# Patient Record
Sex: FEMALE | Race: WHITE | Employment: FULL TIME | URBAN - METROPOLITAN AREA
[De-identification: names, ages, dates, MRNs, and addresses within clinical notes are randomized per-mention and may not be internally consistent; named-entity substitution may affect disease eponyms.]

---

## 2023-01-13 ENCOUNTER — HOSPITAL ENCOUNTER (EMERGENCY)
Age: 33
Discharge: HOME | End: 2023-01-13
Attending: STUDENT IN AN ORGANIZED HEALTH CARE EDUCATION/TRAINING PROGRAM
Payer: COMMERCIAL

## 2023-01-13 VITALS
OXYGEN SATURATION: 99 % | WEIGHT: 164.5 LBS | HEART RATE: 93 BPM | TEMPERATURE: 98.8 F | SYSTOLIC BLOOD PRESSURE: 122 MMHG | DIASTOLIC BLOOD PRESSURE: 84 MMHG | RESPIRATION RATE: 16 BRPM

## 2023-01-13 DIAGNOSIS — T65.91XA INGESTION OF SUBSTANCE, ACCIDENTAL OR UNINTENTIONAL, INITIAL ENCOUNTER: Primary | ICD-10-CM

## 2023-01-13 LAB
AMPHETAMINE SCREEN, URINE: POSITIVE
ANION GAP SERPL CALCULATED.3IONS-SCNC: 13 MMOL/L (ref 3–16)
BARBITURATE SCREEN URINE: ABNORMAL
BENZODIAZEPINE SCREEN, URINE: ABNORMAL
BUN BLDV-MCNC: 13 MG/DL (ref 7–20)
CALCIUM SERPL-MCNC: 9.5 MG/DL (ref 8.3–10.6)
CANNABINOID SCREEN URINE: POSITIVE
CHLORIDE BLD-SCNC: 101 MMOL/L (ref 99–110)
CO2: 25 MMOL/L (ref 21–32)
COCAINE METABOLITE SCREEN URINE: POSITIVE
CREAT SERPL-MCNC: 0.6 MG/DL (ref 0.6–1.1)
EKG ATRIAL RATE: 121 BPM
EKG DIAGNOSIS: NORMAL
EKG P AXIS: 75 DEGREES
EKG P-R INTERVAL: 134 MS
EKG Q-T INTERVAL: 348 MS
EKG QRS DURATION: 78 MS
EKG QTC CALCULATION (BAZETT): 494 MS
EKG R AXIS: 88 DEGREES
EKG T AXIS: 39 DEGREES
EKG VENTRICULAR RATE: 121 BPM
FENTANYL SCREEN, URINE: POSITIVE
GFR SERPL CREATININE-BSD FRML MDRD: >60 ML/MIN/{1.73_M2}
GLUCOSE BLD-MCNC: 94 MG/DL (ref 70–99)
Lab: ABNORMAL
METHADONE SCREEN, URINE: ABNORMAL
OPIATE SCREEN URINE: ABNORMAL
OXYCODONE URINE: ABNORMAL
PH UA: 5
PHENCYCLIDINE SCREEN URINE: ABNORMAL
POTASSIUM REFLEX MAGNESIUM: 3.6 MMOL/L (ref 3.5–5.1)
SODIUM BLD-SCNC: 139 MMOL/L (ref 136–145)
TROPONIN: <0.01 NG/ML

## 2023-01-13 PROCEDURE — 99284 EMERGENCY DEPT VISIT MOD MDM: CPT

## 2023-01-13 PROCEDURE — 36415 COLL VENOUS BLD VENIPUNCTURE: CPT

## 2023-01-13 PROCEDURE — 80307 DRUG TEST PRSMV CHEM ANLYZR: CPT

## 2023-01-13 PROCEDURE — 84484 ASSAY OF TROPONIN QUANT: CPT

## 2023-01-13 PROCEDURE — 80048 BASIC METABOLIC PNL TOTAL CA: CPT

## 2023-01-13 PROCEDURE — 93005 ELECTROCARDIOGRAM TRACING: CPT

## 2023-01-13 PROCEDURE — 93005 ELECTROCARDIOGRAM TRACING: CPT | Performed by: STUDENT IN AN ORGANIZED HEALTH CARE EDUCATION/TRAINING PROGRAM

## 2023-01-13 ASSESSMENT — PAIN - FUNCTIONAL ASSESSMENT: PAIN_FUNCTIONAL_ASSESSMENT: NONE - DENIES PAIN

## 2023-01-13 ASSESSMENT — LIFESTYLE VARIABLES: HOW OFTEN DO YOU HAVE A DRINK CONTAINING ALCOHOL: MONTHLY OR LESS
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
## 2023-01-14 LAB
EKG ATRIAL RATE: 120 BPM
EKG DIAGNOSIS: NORMAL
EKG P AXIS: 74 DEGREES
EKG P-R INTERVAL: 136 MS
EKG Q-T INTERVAL: 348 MS
EKG QRS DURATION: 78 MS
EKG QTC CALCULATION (BAZETT): 491 MS
EKG R AXIS: 87 DEGREES
EKG T AXIS: 43 DEGREES
EKG VENTRICULAR RATE: 120 BPM

## 2023-01-16 LAB
EKG ATRIAL RATE: 120 BPM
EKG ATRIAL RATE: 121 BPM
EKG DIAGNOSIS: NORMAL
EKG DIAGNOSIS: NORMAL
EKG P AXIS: 74 DEGREES
EKG P AXIS: 75 DEGREES
EKG P-R INTERVAL: 134 MS
EKG P-R INTERVAL: 136 MS
EKG Q-T INTERVAL: 348 MS
EKG Q-T INTERVAL: 348 MS
EKG QRS DURATION: 78 MS
EKG QRS DURATION: 78 MS
EKG QTC CALCULATION (BAZETT): 491 MS
EKG QTC CALCULATION (BAZETT): 494 MS
EKG R AXIS: 87 DEGREES
EKG R AXIS: 88 DEGREES
EKG T AXIS: 39 DEGREES
EKG T AXIS: 43 DEGREES
EKG VENTRICULAR RATE: 120 BPM
EKG VENTRICULAR RATE: 121 BPM

## 2023-03-23 ENCOUNTER — HOSPITAL ENCOUNTER (EMERGENCY)
Age: 33
Discharge: LEFT BEFORE DISCHARGE | End: 2023-03-23
Attending: EMERGENCY MEDICINE
Payer: COMMERCIAL

## 2023-03-23 DIAGNOSIS — T50.904A OVERDOSE OF UNDETERMINED INTENT, INITIAL ENCOUNTER: Primary | ICD-10-CM

## 2023-03-23 PROCEDURE — 93005 ELECTROCARDIOGRAM TRACING: CPT | Performed by: STUDENT IN AN ORGANIZED HEALTH CARE EDUCATION/TRAINING PROGRAM

## 2023-03-23 PROCEDURE — 99283 EMERGENCY DEPT VISIT LOW MDM: CPT

## 2023-03-23 ASSESSMENT — ENCOUNTER SYMPTOMS
SHORTNESS OF BREATH: 0
NAUSEA: 0

## 2023-03-23 ASSESSMENT — PAIN - FUNCTIONAL ASSESSMENT: PAIN_FUNCTIONAL_ASSESSMENT: NONE - DENIES PAIN

## 2023-03-24 LAB
EKG ATRIAL RATE: 116 BPM
EKG DIAGNOSIS: NORMAL
EKG P AXIS: 64 DEGREES
EKG P-R INTERVAL: 154 MS
EKG Q-T INTERVAL: 338 MS
EKG QRS DURATION: 92 MS
EKG QTC CALCULATION (BAZETT): 469 MS
EKG R AXIS: 56 DEGREES
EKG T AXIS: 40 DEGREES
EKG VENTRICULAR RATE: 116 BPM

## 2023-03-24 NOTE — ED PROVIDER NOTES
[facility] ENCOUNTER          EM RESIDENT NOTE       Date of evaluation: 3/23/2023    Chief Complaint     Overdose  of Present Illness     Jessica Vale is a 28 y.o. female with Pmhx of substance use presents via EMS for possible overdose. Patient was reportedly found down in a bar bathroom blue and not breathing and unresponsive. However when EMS arrived patient was alert with normal vital signs. No reports of Narcan being administered. Patient reports she used an IV substance that she thought was ecstasy but felt different than when she previously is used ecstasy. She denies any recent IV drug use. Denies any other substance use this evening including alcohol. She denies SI or HI. Denies any symptoms at this time. Review of Systems     Review of Systems   Constitutional:  Negative for chills and fever. HENT:  Negative for congestion. Respiratory:  Negative for shortness of breath. Gastrointestinal:  Negative for nausea. Genitourinary:  Negative for dysuria and flank pain. Neurological:  Negative for dizziness, weakness and numbness. Past Medical, Surgical, Family, and Social History     She has a past medical history of MRSA (methicillin resistant Staphylococcus aureus). She has a past surgical history that includes Leg Surgery. Her family history is not on file. She reports that she has been smoking cigarettes. She has a 4.00 pack-year smoking history. She does not have any smokeless tobacco history on file. She reports that she does not drink alcohol and does not use drugs. Medications     There are no discharge medications for this patient. Allergies     She is allergic to penicillins. Physical Exam     INITIAL VITALS:  ,  ,  ,  ,     Physical Exam  Vitals and nursing note reviewed. Constitutional:       General: She is not in acute distress. HENT:      Head: Normocephalic and atraumatic.    Eyes:      Extraocular Movements: to be included in the SEP-1 core measure due to severe sepsis or septic shock? No Exclusion criteria - the patient is NOT to be included for SEP-1 Core Measure due to: Infection is not suspected    This patient was also evaluated by the attending physician. All care plans werediscussed and agreed upon. Clinical Impression     1. Overdose of undetermined intent, initial encounter        Disposition     PATIENT REFERRED TO:  No follow-up provider specified. DISCHARGE MEDICATIONS:  There are no discharge medications for this patient.       DISPOSITION Eloped - Left Before Treatment Complete 03/23/2023 11:31:18 PM       He Wyatt MD  03/23/23 4199

## 2023-03-24 NOTE — ED PROVIDER NOTES
ED Attending Attestation Note     Date of evaluation: 3/23/2023    This patient was seen by the resident. I have seen and examined the patient, agree with the workup, evaluation, management and diagnosis. The care plan has been discussed. My assessment reveals an awake and alert 28 y.o. F who presents with concern for OD. No opiate toxidrome clinically. RN informed me that she walked out without notifying staff. There had been nothing to suggest she was without decision making capacity or the ability to leave on her own.       Christin Chance MD  03/23/23 0349

## 2023-03-24 NOTE — ED NOTES
Patient was not in the room. Charge RN found the patient to be leaving. Patient eloped before tx was complete.       Kaylynn Singletary RN  03/23/23 8946

## 2023-03-29 ENCOUNTER — APPOINTMENT (OUTPATIENT)
Dept: MRI IMAGING | Age: 33
DRG: 720 | End: 2023-03-29
Payer: COMMERCIAL

## 2023-03-29 ENCOUNTER — APPOINTMENT (OUTPATIENT)
Dept: CT IMAGING | Age: 33
DRG: 720 | End: 2023-03-29
Payer: COMMERCIAL

## 2023-03-29 ENCOUNTER — HOSPITAL ENCOUNTER (INPATIENT)
Age: 33
LOS: 1 days | Discharge: LEFT BEFORE DISCHARGE | DRG: 720 | End: 2023-03-30
Attending: EMERGENCY MEDICINE | Admitting: FAMILY MEDICINE
Payer: COMMERCIAL

## 2023-03-29 VITALS
SYSTOLIC BLOOD PRESSURE: 104 MMHG | BODY MASS INDEX: 33.16 KG/M2 | TEMPERATURE: 99.2 F | RESPIRATION RATE: 18 BRPM | DIASTOLIC BLOOD PRESSURE: 66 MMHG | HEIGHT: 59 IN | HEART RATE: 130 BPM | OXYGEN SATURATION: 94 % | WEIGHT: 164.5 LBS

## 2023-03-29 DIAGNOSIS — E87.1 HYPONATREMIA: ICD-10-CM

## 2023-03-29 DIAGNOSIS — M54.6 ACUTE MIDLINE THORACIC BACK PAIN: ICD-10-CM

## 2023-03-29 DIAGNOSIS — A41.9 SEPTICEMIA (HCC): Primary | ICD-10-CM

## 2023-03-29 DIAGNOSIS — F19.10 INTRAVENOUS DRUG ABUSE (HCC): ICD-10-CM

## 2023-03-29 DIAGNOSIS — I76 SEPTIC EMBOLISM (HCC): ICD-10-CM

## 2023-03-29 LAB
ALBUMIN SERPL-MCNC: 2.8 G/DL (ref 3.4–5)
ALBUMIN/GLOB SERPL: 0.6 {RATIO} (ref 1.1–2.2)
ALP SERPL-CCNC: 175 U/L (ref 40–129)
ALT SERPL-CCNC: 14 U/L (ref 10–40)
ANION GAP SERPL CALCULATED.3IONS-SCNC: 12 MMOL/L (ref 3–16)
AST SERPL-CCNC: 45 U/L (ref 15–37)
BACTERIA URNS QL MICRO: ABNORMAL /HPF
BASOPHILS # BLD: 0.1 K/UL (ref 0–0.2)
BASOPHILS NFR BLD: 0.4 %
BILIRUB SERPL-MCNC: 0.7 MG/DL (ref 0–1)
BILIRUB UR QL STRIP.AUTO: NEGATIVE
BUN SERPL-MCNC: 11 MG/DL (ref 7–20)
CALCIUM SERPL-MCNC: 8.8 MG/DL (ref 8.3–10.6)
CHARACTER UR: ABNORMAL
CHLORIDE SERPL-SCNC: 89 MMOL/L (ref 99–110)
CLARITY UR: CLEAR
CO2 SERPL-SCNC: 25 MMOL/L (ref 21–32)
COLOR UR: ABNORMAL
CREAT SERPL-MCNC: <0.5 MG/DL (ref 0.6–1.1)
CRP SERPL-MCNC: 331.3 MG/L (ref 0–5.1)
DEPRECATED RDW RBC AUTO: 15 % (ref 12.4–15.4)
EKG ATRIAL RATE: 114 BPM
EKG DIAGNOSIS: NORMAL
EKG P AXIS: 72 DEGREES
EKG P-R INTERVAL: 134 MS
EKG Q-T INTERVAL: 332 MS
EKG QRS DURATION: 92 MS
EKG QTC CALCULATION (BAZETT): 457 MS
EKG R AXIS: 85 DEGREES
EKG T AXIS: 60 DEGREES
EKG VENTRICULAR RATE: 114 BPM
EOSINOPHIL # BLD: 0.1 K/UL (ref 0–0.6)
EOSINOPHIL NFR BLD: 0.5 %
EPI CELLS #/AREA URNS AUTO: 2 /HPF (ref 0–5)
ERYTHROCYTE [SEDIMENTATION RATE] IN BLOOD BY WESTERGREN METHOD: 121 MM/HR (ref 0–20)
FLUAV RNA UPPER RESP QL NAA+PROBE: NEGATIVE
FLUBV AG NPH QL: NEGATIVE
GFR SERPLBLD CREATININE-BSD FMLA CKD-EPI: >60 ML/MIN/{1.73_M2}
GLUCOSE SERPL-MCNC: 118 MG/DL (ref 70–99)
GLUCOSE UR STRIP.AUTO-MCNC: NEGATIVE MG/DL
HCG SERPL QL: NEGATIVE
HCT VFR BLD AUTO: 34.8 % (ref 36–48)
HGB BLD-MCNC: 11.4 G/DL (ref 12–16)
HGB UR QL STRIP.AUTO: ABNORMAL
HYALINE CASTS #/AREA URNS AUTO: 0 /LPF (ref 0–8)
KETONES UR STRIP.AUTO-MCNC: 15 MG/DL
LACTATE BLDV-SCNC: 1.3 MMOL/L (ref 0.4–1.9)
LACTATE BLDV-SCNC: 1.6 MMOL/L (ref 0.4–1.9)
LEUKOCYTE ESTERASE UR QL STRIP.AUTO: ABNORMAL
LYMPHOCYTES # BLD: 0.9 K/UL (ref 1–5.1)
LYMPHOCYTES NFR BLD: 4 %
MCH RBC QN AUTO: 26.8 PG (ref 26–34)
MCHC RBC AUTO-ENTMCNC: 32.7 G/DL (ref 31–36)
MCV RBC AUTO: 81.9 FL (ref 80–100)
MONOCYTES # BLD: 1.6 K/UL (ref 0–1.3)
MONOCYTES NFR BLD: 7.1 %
NEUTROPHILS # BLD: 19.9 K/UL (ref 1.7–7.7)
NEUTROPHILS NFR BLD: 88 %
NITRITE UR QL STRIP.AUTO: NEGATIVE
PH UR STRIP.AUTO: 5.5 [PH] (ref 5–8)
PLATELET # BLD AUTO: 318 K/UL (ref 135–450)
PMV BLD AUTO: 7.9 FL (ref 5–10.5)
POTASSIUM SERPL-SCNC: 5.1 MMOL/L (ref 3.5–5.1)
PROCALCITONIN SERPL IA-MCNC: 0.32 NG/ML (ref 0–0.15)
PROT SERPL-MCNC: 7.7 G/DL (ref 6.4–8.2)
PROT UR STRIP.AUTO-MCNC: 100 MG/DL
RBC # BLD AUTO: 4.25 M/UL (ref 4–5.2)
RBC #/AREA URNS HPF: ABNORMAL /HPF (ref 0–4)
REPORT: NORMAL
SARS-COV-2 RDRP RESP QL NAA+PROBE: NOT DETECTED
SODIUM SERPL-SCNC: 126 MMOL/L (ref 136–145)
SP GR UR STRIP.AUTO: >=1.03 (ref 1–1.03)
UA COMPLETE W REFLEX CULTURE PNL UR: YES
UA DIPSTICK W REFLEX MICRO PNL UR: YES
URN SPEC COLLECT METH UR: ABNORMAL
UROBILINOGEN UR STRIP-ACNC: 1 E.U./DL
WBC # BLD AUTO: 22.6 K/UL (ref 4–11)
WBC #/AREA URNS AUTO: 10 /HPF (ref 0–5)

## 2023-03-29 PROCEDURE — 87804 INFLUENZA ASSAY W/OPTIC: CPT

## 2023-03-29 PROCEDURE — 93010 ELECTROCARDIOGRAM REPORT: CPT | Performed by: INTERNAL MEDICINE

## 2023-03-29 PROCEDURE — 6360000002 HC RX W HCPCS: Performed by: FAMILY MEDICINE

## 2023-03-29 PROCEDURE — 93005 ELECTROCARDIOGRAM TRACING: CPT | Performed by: EMERGENCY MEDICINE

## 2023-03-29 PROCEDURE — 87040 BLOOD CULTURE FOR BACTERIA: CPT

## 2023-03-29 PROCEDURE — 96374 THER/PROPH/DIAG INJ IV PUSH: CPT

## 2023-03-29 PROCEDURE — 6370000000 HC RX 637 (ALT 250 FOR IP): Performed by: EMERGENCY MEDICINE

## 2023-03-29 PROCEDURE — 72157 MRI CHEST SPINE W/O & W/DYE: CPT

## 2023-03-29 PROCEDURE — 6360000004 HC RX CONTRAST MEDICATION: Performed by: EMERGENCY MEDICINE

## 2023-03-29 PROCEDURE — 96375 TX/PRO/DX INJ NEW DRUG ADDON: CPT

## 2023-03-29 PROCEDURE — 72158 MRI LUMBAR SPINE W/O & W/DYE: CPT

## 2023-03-29 PROCEDURE — 96365 THER/PROPH/DIAG IV INF INIT: CPT

## 2023-03-29 PROCEDURE — 94760 N-INVAS EAR/PLS OXIMETRY 1: CPT

## 2023-03-29 PROCEDURE — 6360000002 HC RX W HCPCS: Performed by: EMERGENCY MEDICINE

## 2023-03-29 PROCEDURE — 87086 URINE CULTURE/COLONY COUNT: CPT

## 2023-03-29 PROCEDURE — 2060000000 HC ICU INTERMEDIATE R&B

## 2023-03-29 PROCEDURE — 87635 SARS-COV-2 COVID-19 AMP PRB: CPT

## 2023-03-29 PROCEDURE — 71260 CT THORAX DX C+: CPT | Performed by: EMERGENCY MEDICINE

## 2023-03-29 PROCEDURE — 96367 TX/PROPH/DG ADDL SEQ IV INF: CPT

## 2023-03-29 PROCEDURE — 84145 PROCALCITONIN (PCT): CPT

## 2023-03-29 PROCEDURE — 83605 ASSAY OF LACTIC ACID: CPT

## 2023-03-29 PROCEDURE — 36415 COLL VENOUS BLD VENIPUNCTURE: CPT

## 2023-03-29 PROCEDURE — 87150 DNA/RNA AMPLIFIED PROBE: CPT

## 2023-03-29 PROCEDURE — 99285 EMERGENCY DEPT VISIT HI MDM: CPT

## 2023-03-29 PROCEDURE — 2580000003 HC RX 258: Performed by: FAMILY MEDICINE

## 2023-03-29 PROCEDURE — 85652 RBC SED RATE AUTOMATED: CPT

## 2023-03-29 PROCEDURE — 86140 C-REACTIVE PROTEIN: CPT

## 2023-03-29 PROCEDURE — 84703 CHORIONIC GONADOTROPIN ASSAY: CPT

## 2023-03-29 PROCEDURE — 85025 COMPLETE CBC W/AUTO DIFF WBC: CPT

## 2023-03-29 PROCEDURE — A9577 INJ MULTIHANCE: HCPCS | Performed by: EMERGENCY MEDICINE

## 2023-03-29 PROCEDURE — 80053 COMPREHEN METABOLIC PANEL: CPT

## 2023-03-29 PROCEDURE — 87186 SC STD MICRODIL/AGAR DIL: CPT

## 2023-03-29 PROCEDURE — 6370000000 HC RX 637 (ALT 250 FOR IP): Performed by: FAMILY MEDICINE

## 2023-03-29 PROCEDURE — 2580000003 HC RX 258: Performed by: EMERGENCY MEDICINE

## 2023-03-29 PROCEDURE — 81001 URINALYSIS AUTO W/SCOPE: CPT

## 2023-03-29 RX ORDER — TRAMADOL HYDROCHLORIDE 50 MG/1
50 TABLET ORAL PRN
Status: DISCONTINUED | OUTPATIENT
Start: 2023-03-29 | End: 2023-03-30 | Stop reason: HOSPADM

## 2023-03-29 RX ORDER — ONDANSETRON 2 MG/ML
4 INJECTION INTRAMUSCULAR; INTRAVENOUS ONCE
Status: COMPLETED | OUTPATIENT
Start: 2023-03-29 | End: 2023-03-29

## 2023-03-29 RX ORDER — SODIUM CHLORIDE 0.9 % (FLUSH) 0.9 %
5-40 SYRINGE (ML) INJECTION EVERY 12 HOURS SCHEDULED
Status: DISCONTINUED | OUTPATIENT
Start: 2023-03-29 | End: 2023-03-30 | Stop reason: HOSPADM

## 2023-03-29 RX ORDER — SODIUM CHLORIDE, SODIUM LACTATE, POTASSIUM CHLORIDE, AND CALCIUM CHLORIDE .6; .31; .03; .02 G/100ML; G/100ML; G/100ML; G/100ML
1000 INJECTION, SOLUTION INTRAVENOUS ONCE
Status: COMPLETED | OUTPATIENT
Start: 2023-03-29 | End: 2023-03-29

## 2023-03-29 RX ORDER — KETOROLAC TROMETHAMINE 30 MG/ML
15 INJECTION, SOLUTION INTRAMUSCULAR; INTRAVENOUS ONCE
Status: COMPLETED | OUTPATIENT
Start: 2023-03-29 | End: 2023-03-29

## 2023-03-29 RX ORDER — ACETAMINOPHEN 500 MG
500 TABLET ORAL EVERY 6 HOURS PRN
COMMUNITY

## 2023-03-29 RX ORDER — SODIUM CHLORIDE 0.9 % (FLUSH) 0.9 %
5-40 SYRINGE (ML) INJECTION PRN
Status: DISCONTINUED | OUTPATIENT
Start: 2023-03-29 | End: 2023-03-30 | Stop reason: HOSPADM

## 2023-03-29 RX ORDER — LORAZEPAM 2 MG/ML
2 INJECTION INTRAMUSCULAR ONCE
Status: COMPLETED | OUTPATIENT
Start: 2023-03-29 | End: 2023-03-29

## 2023-03-29 RX ORDER — KETOROLAC TROMETHAMINE 30 MG/ML
15 INJECTION, SOLUTION INTRAMUSCULAR; INTRAVENOUS EVERY 6 HOURS PRN
Status: DISCONTINUED | OUTPATIENT
Start: 2023-03-29 | End: 2023-03-30 | Stop reason: HOSPADM

## 2023-03-29 RX ORDER — SODIUM CHLORIDE 9 MG/ML
INJECTION, SOLUTION INTRAVENOUS PRN
Status: DISCONTINUED | OUTPATIENT
Start: 2023-03-29 | End: 2023-03-30 | Stop reason: HOSPADM

## 2023-03-29 RX ORDER — ACETAMINOPHEN 325 MG/1
650 TABLET ORAL EVERY 6 HOURS PRN
Status: DISCONTINUED | OUTPATIENT
Start: 2023-03-29 | End: 2023-03-30 | Stop reason: HOSPADM

## 2023-03-29 RX ORDER — DIAZEPAM 5 MG/1
10 TABLET ORAL ONCE
Status: COMPLETED | OUTPATIENT
Start: 2023-03-29 | End: 2023-03-29

## 2023-03-29 RX ORDER — HYDROXYZINE PAMOATE 25 MG/1
50 CAPSULE ORAL EVERY 8 HOURS PRN
Status: DISCONTINUED | OUTPATIENT
Start: 2023-03-29 | End: 2023-03-30 | Stop reason: HOSPADM

## 2023-03-29 RX ORDER — TRAZODONE HYDROCHLORIDE 50 MG/1
50 TABLET ORAL NIGHTLY PRN
Status: DISCONTINUED | OUTPATIENT
Start: 2023-03-29 | End: 2023-03-30 | Stop reason: HOSPADM

## 2023-03-29 RX ORDER — ENOXAPARIN SODIUM 100 MG/ML
40 INJECTION SUBCUTANEOUS NIGHTLY
Status: DISCONTINUED | OUTPATIENT
Start: 2023-03-29 | End: 2023-03-30 | Stop reason: HOSPADM

## 2023-03-29 RX ORDER — BUPRENORPHINE AND NALOXONE 8; 2 MG/1; MG/1
1 FILM, SOLUBLE BUCCAL; SUBLINGUAL ONCE
Status: COMPLETED | OUTPATIENT
Start: 2023-03-29 | End: 2023-03-29

## 2023-03-29 RX ORDER — ACETAMINOPHEN 650 MG/1
650 SUPPOSITORY RECTAL EVERY 6 HOURS PRN
Status: DISCONTINUED | OUTPATIENT
Start: 2023-03-29 | End: 2023-03-30 | Stop reason: HOSPADM

## 2023-03-29 RX ORDER — ACETAMINOPHEN 325 MG/1
650 TABLET ORAL ONCE
Status: COMPLETED | OUTPATIENT
Start: 2023-03-29 | End: 2023-03-29

## 2023-03-29 RX ORDER — SODIUM CHLORIDE 9 MG/ML
INJECTION, SOLUTION INTRAVENOUS CONTINUOUS
Status: DISCONTINUED | OUTPATIENT
Start: 2023-03-29 | End: 2023-03-30 | Stop reason: HOSPADM

## 2023-03-29 RX ADMIN — SODIUM CHLORIDE, PRESERVATIVE FREE 10 ML: 5 INJECTION INTRAVENOUS at 20:51

## 2023-03-29 RX ADMIN — VANCOMYCIN HYDROCHLORIDE 1000 MG: 1 INJECTION, POWDER, LYOPHILIZED, FOR SOLUTION INTRAVENOUS at 19:55

## 2023-03-29 RX ADMIN — KETOROLAC TROMETHAMINE 15 MG: 30 INJECTION, SOLUTION INTRAMUSCULAR at 05:33

## 2023-03-29 RX ADMIN — TRAMADOL HYDROCHLORIDE 50 MG: 50 TABLET ORAL at 20:50

## 2023-03-29 RX ADMIN — LORAZEPAM 2 MG: 2 INJECTION, SOLUTION INTRAMUSCULAR; INTRAVENOUS at 08:04

## 2023-03-29 RX ADMIN — SODIUM CHLORIDE: 9 INJECTION, SOLUTION INTRAVENOUS at 17:32

## 2023-03-29 RX ADMIN — CEFEPIME 2000 MG: 2 INJECTION, POWDER, FOR SOLUTION INTRAVENOUS at 07:08

## 2023-03-29 RX ADMIN — CEFEPIME 2000 MG: 2 INJECTION, POWDER, FOR SOLUTION INTRAVENOUS at 16:34

## 2023-03-29 RX ADMIN — TRAZODONE HYDROCHLORIDE 50 MG: 50 TABLET ORAL at 20:50

## 2023-03-29 RX ADMIN — SODIUM CHLORIDE, POTASSIUM CHLORIDE, SODIUM LACTATE AND CALCIUM CHLORIDE 1000 ML: 600; 310; 30; 20 INJECTION, SOLUTION INTRAVENOUS at 09:24

## 2023-03-29 RX ADMIN — KETOROLAC TROMETHAMINE 15 MG: 30 INJECTION, SOLUTION INTRAMUSCULAR at 17:52

## 2023-03-29 RX ADMIN — GADOBENATE DIMEGLUMINE 14 ML: 529 INJECTION, SOLUTION INTRAVENOUS at 14:28

## 2023-03-29 RX ADMIN — ACETAMINOPHEN 650 MG: 325 TABLET ORAL at 05:39

## 2023-03-29 RX ADMIN — BUPRENORPHINE AND NALOXONE 1 FILM: 8; 2 FILM BUCCAL; SUBLINGUAL at 12:25

## 2023-03-29 RX ADMIN — ONDANSETRON 4 MG: 2 INJECTION INTRAMUSCULAR; INTRAVENOUS at 05:31

## 2023-03-29 RX ADMIN — DIAZEPAM 10 MG: 5 TABLET ORAL at 12:11

## 2023-03-29 RX ADMIN — IOPAMIDOL 75 ML: 755 INJECTION, SOLUTION INTRAVENOUS at 05:56

## 2023-03-29 RX ADMIN — VANCOMYCIN HYDROCHLORIDE 1000 MG: 1 INJECTION, POWDER, LYOPHILIZED, FOR SOLUTION INTRAVENOUS at 09:26

## 2023-03-29 RX ADMIN — ACETAMINOPHEN 650 MG: 325 TABLET ORAL at 17:35

## 2023-03-29 RX ADMIN — ENOXAPARIN SODIUM 40 MG: 100 INJECTION SUBCUTANEOUS at 20:50

## 2023-03-29 ASSESSMENT — LIFESTYLE VARIABLES
HOW MANY STANDARD DRINKS CONTAINING ALCOHOL DO YOU HAVE ON A TYPICAL DAY: PATIENT DOES NOT DRINK
HOW OFTEN DO YOU HAVE A DRINK CONTAINING ALCOHOL: NEVER

## 2023-03-29 ASSESSMENT — PAIN SCALES - GENERAL
PAINLEVEL_OUTOF10: 10

## 2023-03-29 ASSESSMENT — PAIN - FUNCTIONAL ASSESSMENT: PAIN_FUNCTIONAL_ASSESSMENT: 0-10

## 2023-03-29 NOTE — ED NOTES
Bedside report given to Sahara Damon RN. Transferred to . VSS & alert at this time.        Bailey Blancas RN  03/29/23 2914

## 2023-03-29 NOTE — PROGRESS NOTES
RN asked MD if patient was an appropriate candidate for the cows scale. MD did respond and agreeded she was. RN stopped Cefepime that was started in the ER due to blood cultures not being done. Lab tried to collect blood for blood cultures, but could not get any blood on floor. RN perfect served ID to ask if all anbx. Should be held until lab can get the blood for the blood cultures. Waiting on response at this time.

## 2023-03-29 NOTE — PROGRESS NOTES
Pharmacy Home Medication Reconciliation Note    A medication reconciliation has been completed for Quentin Rao [id number]    Pharmacy: [facility] [phone]  Information provided by: Patient     The patient's home medication list is as follows: No current facility-administered medications on file prior to encounter. Current Outpatient Medications on File Prior to Encounter   Medication Sig Dispense Refill    acetaminophen (TYLENOL) 500 MG tablet Take 500 mg by mouth every 6 hours as needed for Pain         Of note, Patient is Currently not taking any medications, besides OTC Tylenol as needed for pain    Timing of last doses updated.     Thank you,  Eri Damon [facility]

## 2023-03-29 NOTE — ED NOTES
Call received from MRI. Pt unable to complete scan; continues to shake legs & touch the scanner with hands. Dr. Pires Learn made aware.        Wilda Feng RN  03/29/23 8757

## 2023-03-29 NOTE — PROGRESS NOTES
Patient admitted to room 3361 from ED. Patient oriented to room, call light, bed rails, phone, lights and bathroom. Patient instructed about prescribed diet, how to use call light, and television. Patient is independent in room and girlfriend is at bedside. Telemetry box 3361 in place, patient aware of placement and reason. Bed locked, in lowest position, side rails up 2/4, call light within reach, patient encouraged to call out with any needs.
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
## 2023-03-29 NOTE — PROGRESS NOTES
Clinical Pharmacy Note: Pharmacy to Dose Vancomycin    Maria D Toth is a 33 y.o. female started on Vancomycin for sepsis; consult received from Dr. Gibson to manage therapy. Also receiving the following antibiotics: cefepime.    Goal AUC: 400-600 mg/L*hr  Goal Trough Level: 15-20 mcg/mL    Assessment/Plan:  A 1000 mg loading dose was given on 3/29 at 0929  Initiate vancomycin 1000 mg IV every 8 hours. Bayesian modeling predicts an AUC of 554 mg/L*hr and a trough of 17.4 mcg/mL at steady state concentration.  A vancomycin random level has been ordered for 3/30 at 0800  Changes in regimen will be determined based on culture results, renal function, and clinical response.  Pharmacy will continue to monitor and adjust regimen as necessary.    Allergies:  Penicillins     Recent Labs     03/29/23  0445   CREATININE <0.5*       Recent Labs     03/29/23  0445   WBC 22.6*       Ht Readings from Last 1 Encounters:   No data found for Ht        Wt Readings from Last 1 Encounters:   03/29/23 163 lb (73.9 kg)         CrCl cannot be calculated (Unknown ideal weight.).      Thank you for the consult,    Mini Oseguera, PharmD, BCCCP

## 2023-03-29 NOTE — H&P
palpable, equal bilaterally       Labs:     Recent Labs     03/29/23  0445   WBC 22.6*   HGB 11.4*   HCT 34.8*        Recent Labs     03/29/23  0445   *   K 5.1   CL 89*   CO2 25   BUN 11   CREATININE <0.5*   CALCIUM 8.8     Recent Labs     03/29/23  0445   AST 45*   ALT 14   BILITOT 0.7   ALKPHOS 175*     No results for input(s): INR in the last 72 hours. No results for input(s): Steff Fuchs in the last 72 hours. Urinalysis:      Lab Results   Component Value Date/Time    NITRU Negative 03/29/2023 12:16 PM    WBCUA 10 03/29/2023 12:16 PM    BACTERIA None Seen 03/29/2023 12:16 PM    RBCUA 0-2 03/29/2023 12:16 PM    BLOODU MODERATE 03/29/2023 12:16 PM    SPECGRAV >=1.030 03/29/2023 12:16 PM    GLUCOSEU Negative 03/29/2023 12:16 PM       Radiology:       MRI LUMBAR SPINE W WO CONTRAST   Final Result   Motion degraded MRI of the T-spine. Complex intramuscular abscess with numerous septations along the mid to upper   back, on the right-hand side. Partially visualized nodular and cavitary airspace opacities within both   lungs, consistent with septic emboli. No acute abnormality of the L-spine visualized. MRI THORACIC SPINE W WO CONTRAST   Final Result   Motion degraded MRI of the T-spine. Complex intramuscular abscess with numerous septations along the mid to upper   back, on the right-hand side. Partially visualized nodular and cavitary airspace opacities within both   lungs, consistent with septic emboli. No acute abnormality of the L-spine visualized. CT CHEST PULMONARY EMBOLISM W CONTRAST   Final Result   1. There are numerous inflammatory appearing pulmonary nodules seen   throughout the lungs bilaterally, some of which demonstrating mild central   cavitation.   A dominant right upper lobe nodule measuring 2.0 x 1.4 cm on   axial image 72 shows central low attenuation, which may be related to a small   degree of necrosis though cannot exclude a

## 2023-03-29 NOTE — ED PROVIDER NOTES
20 mg/dL    Creatinine <0.5 (L) 0.6 - 1.1 mg/dL    Est, Glom Filt Rate >60 >60    Calcium 8.8 8.3 - 10.6 mg/dL    Total Protein 7.7 6.4 - 8.2 g/dL    Albumin 2.8 (L) 3.4 - 5.0 g/dL    Albumin/Globulin Ratio 0.6 (L) 1.1 - 2.2    Total Bilirubin 0.7 0.0 - 1.0 mg/dL    Alkaline Phosphatase 175 (H) 40 - 129 U/L    ALT 14 10 - 40 U/L    AST 45 (H) 15 - 37 U/L   Urinalysis with Reflex to Culture    Specimen: Urine   Result Value Ref Range    Color, UA DARK YELLOW (A) Straw/Yellow    Clarity, UA Clear Clear    Glucose, Ur Negative Negative mg/dL    Bilirubin Urine Negative Negative    Ketones, Urine 15 (A) Negative mg/dL    Specific Gravity, UA >=1.030 1.005 - 1.030    Blood, Urine MODERATE (A) Negative    pH, UA 5.5 5.0 - 8.0    Protein,  (A) Negative mg/dL    Urobilinogen, Urine 1.0 <2.0 E.U./dL    Nitrite, Urine Negative Negative    Leukocyte Esterase, Urine TRACE (A) Negative    Microscopic Examination YES     Urine Type Voided     Urine Reflex to Culture Yes    HCG Qualitative, Serum   Result Value Ref Range    hCG Qual Negative Detects HCG level >10 MIU/mL   Sedimentation Rate   Result Value Ref Range    Sed Rate 121 (H) 0 - 20 mm/Hr   C-Reactive Protein   Result Value Ref Range    .3 (H) 0.0 - 5.1 mg/L   Procalcitonin   Result Value Ref Range    Procalcitonin 0.32 (H) 0.00 - 0.15 ng/mL   Lactate, Sepsis   Result Value Ref Range    Lactic Acid, Sepsis 1.6 0.4 - 1.9 mmol/L   Lactate, Sepsis   Result Value Ref Range    Lactic Acid, Sepsis 1.3 0.4 - 1.9 mmol/L   Microscopic Urinalysis   Result Value Ref Range    RBC, UA 0-2 0 - 4 /HPF    Bacteria, UA None Seen None Seen /HPF    Urinalysis Comments see below     Hyaline Casts, UA 0 0 - 8 /LPF    WBC, UA 10 (H) 0 - 5 /HPF    Epithelial Cells, UA 2 0 - 5 /HPF   EKG 12 Lead   Result Value Ref Range    Ventricular Rate 114 BPM    Atrial Rate 114 BPM    P-R Interval 134 ms    QRS Duration 92 ms    Q-T Interval 332 ms    QTc Calculation (Bazett) 457 ms    P Axis
- 8.2 g/dL    Albumin 2.8 (L) 3.4 - 5.0 g/dL    Albumin/Globulin Ratio 0.6 (L) 1.1 - 2.2    Total Bilirubin 0.7 0.0 - 1.0 mg/dL    Alkaline Phosphatase 175 (H) 40 - 129 U/L    ALT 14 10 - 40 U/L    AST 45 (H) 15 - 37 U/L   HCG Qualitative, Serum   Result Value Ref Range    hCG Qual Negative Detects HCG level >10 MIU/mL   Sedimentation Rate   Result Value Ref Range    Sed Rate 121 (H) 0 - 20 mm/Hr   C-Reactive Protein   Result Value Ref Range    .3 (H) 0.0 - 5.1 mg/L   Procalcitonin   Result Value Ref Range    Procalcitonin 0.32 (H) 0.00 - 0.15 ng/mL   Lactate, Sepsis   Result Value Ref Range    Lactic Acid, Sepsis 1.6 0.4 - 1.9 mmol/L   EKG 12 Lead   Result Value Ref Range    Ventricular Rate 114 BPM    Atrial Rate 114 BPM    P-R Interval 134 ms    QRS Duration 92 ms    Q-T Interval 332 ms    QTc Calculation (Bazett) 457 ms    P Axis 72 degrees    R Axis 85 degrees    T Axis 60 degrees    Diagnosis       Sinus tachycardiaPossible Left atrial enlargementBorderline ECG       Screenings   [place] Coma Scale  Eye Opening: Spontaneous  Best Verbal Response: Oriented  Best Motor Response: Obeys commands  [place] Coma Scale Score: 15       Is this patient to be included in the SEP-1 Core Measure due to severe sepsis or septic shock? No   Exclusion criteria - the patient is NOT to be included for SEP-1 Core Measure due to:  May have criteria for sepsis, but does not meet criteria for severe sepsis or septic shock      MDM and ED Course    Patient febrile, modestly ill-appearing however nontoxic, initial evaluation. She is alert and protecting her airway, no clinical evidence of intoxication. Given patient's ongoing intravenous drug abuse, my initial concern is for infection as cause of back pain and fever. Laboratory work-up with leukocytosis as well as significant elevation of ESR/CRP. No other evidence of acute endorgan dysfunction, there is mild hyponatremia however no other critical electrolyte derangement.

## 2023-03-30 LAB — BACTERIA UR CULT: NORMAL

## 2023-03-30 NOTE — DISCHARGE SUMMARY
03/29/2023 04:45 AM    LABALBU 2.8 03/29/2023 04:45 AM     No results found for: INR    Radiology:  MRI THORACIC SPINE W WO CONTRAST    Result Date: 3/29/2023  EXAMINATION: MRI OF THE THORACIC SPINE WITHOUT AND WITH CONTRAST; MRI OF THE LUMBAR SPINE WITHOUT AND WITH CONTRAST  3/29/2023 8:24 am; 3/29/2023 8:25 am TECHNIQUE: Multiplanar multisequence MRI of the thoracic spine was performed without and with the administration of intravenous contrast.; Multiplanar multisequence MRI of the lumbar spine was performed without and with the administration of intravenous contrast. COMPARISON: None HISTORY: ORDERING SYSTEM PROVIDED HISTORY: T4-T8 midline back pain, IVDA, eval osteomyelitis TECHNOLOGIST PROVIDED HISTORY: Reason for exam:->T4-T8 midline back pain, IVDA, eval osteomyelitis What is the sedation requirement?->None Reason for Exam: T4-T-8 midline back pain. No known injury. Eval for osteomylelitis. IVDA. Best images obtianed with constant patient motion. ; ORDERING SYSTEM PROVIDED HISTORY: low back pain, IVDA, fever TECHNOLOGIST PROVIDED HISTORY: Reason for exam:->low back pain, IVDA, fever What is the sedation requirement?->None FINDINGS: MRI thoracic spine: The study is degraded by significant motion artifact. There is no MRI evidence of discitis or epidural abscess. The thoracic cord is grossly normal in size and signal intensity. Nodular and cavitary airspace opacities are seen within both lungs. There is a complex intramuscular fluid collection along the mid to upper back, on the right-hand side, measuring at least 14 x 1.9 x 3.5 cm in diameter. MRI lumbar spine: There is a normal lumbar lordosis. No acute fracture or traumatic subluxation is identified. There is no evidence of discitis-osteomyelitis. No abnormal enhancement of the L-spine is visualized postcontrast administration. Motion degraded MRI of the T-spine.  Complex intramuscular abscess with numerous septations along the mid to upper back, on

## 2023-03-30 NOTE — PROGRESS NOTES
Clinical Pharmacy Note: Positive Blood Culture Documentation    Pharmacy was notified by lab that Hodan Hardin has positive blood cultures. Patient is positive for Staphylococcus aureus in one out of two sets. Resistance markers present: none    Name of lab caller: Sulaiman Mchugh  Name of receiving pharmacist: Israel Lucero  Time: 2111    Patient's current antimicrobial regimen of vancomycin does provide appropriate empiric coverage. Dr. Chantell Byers was notified of the positive result at 2115.       Thank you,  Babar Connelly, PharmD, [facility]  [phone]

## 2023-03-30 NOTE — PROGRESS NOTES
On 3/29/2023 at 2159 it was discovered that this patient was no longer in the room. Tele was on the bedside table and the IV was disconnected without signs of the IV access. Saloni Fine was called. Clinical , Gutierrez Jaime RN, was contacted and notified risk management. The phone number associated with the chart belongs to a family friend, who provided the correct number for the patient's Mother. This nurse called and left a message. [facility] police were contacted and ask to go to the patient's home for a welfare check. The address provided is located in [facility]. [facility] sent officer to check the grounds around the hospital. [employer] police were contacted and asked to do a welfare visit. They called back to report it was not a correct address. The family friend called and told this nurse, \"I received a text message from Nickolas Talamantes stating that she was in the hospital.\" The friend then gave this nurse the cell phone number that he received associated with the text message. The number was called and there was no answer. A message was left with specific instructions to return to the hospital to have the IV removed. Security was able to see the patient with a friend walking the halls and eventually leaving the building and getting into a car. There have been no further return phone calls regarding this patient.

## 2023-03-30 NOTE — PROGRESS NOTES
3-29-23 at 2159 this nurse entered patients room to put tele monitor back on. Patient was not in the room or bathroom. IV antibiotics were found to be disconnected but no IV found laying in room. Marge munroe was called, charge nurse and security notified. Police department called but Phone number and address patient listed were incorrect.

## 2023-03-31 LAB
BACTERIA BLD CULT ORG #2: ABNORMAL
BACTERIA BLD CULT ORG #2: ABNORMAL
BACTERIA BLD CULT: ABNORMAL
BACTERIA BLD CULT: ABNORMAL
ORGANISM: ABNORMAL

## 2023-04-01 LAB
BACTERIA BLD CULT ORG #2: ABNORMAL
BACTERIA BLD CULT: ABNORMAL
ORGANISM: ABNORMAL
ORGANISM: ABNORMAL